# Patient Record
Sex: MALE | Race: WHITE | Employment: FULL TIME | URBAN - METROPOLITAN AREA
[De-identification: names, ages, dates, MRNs, and addresses within clinical notes are randomized per-mention and may not be internally consistent; named-entity substitution may affect disease eponyms.]

---

## 2020-08-10 ENCOUNTER — APPOINTMENT (OUTPATIENT)
Dept: GENERAL RADIOLOGY | Age: 35
End: 2020-08-10
Payer: COMMERCIAL

## 2020-08-10 ENCOUNTER — HOSPITAL ENCOUNTER (EMERGENCY)
Age: 35
Discharge: HOME | End: 2020-08-10
Attending: EMERGENCY MEDICINE
Payer: COMMERCIAL

## 2020-08-10 VITALS
DIASTOLIC BLOOD PRESSURE: 84 MMHG | HEIGHT: 68 IN | BODY MASS INDEX: 47.74 KG/M2 | OXYGEN SATURATION: 97 % | HEART RATE: 83 BPM | RESPIRATION RATE: 13 BRPM | SYSTOLIC BLOOD PRESSURE: 116 MMHG | TEMPERATURE: 98.4 F | WEIGHT: 315 LBS

## 2020-08-10 LAB
ABSOLUTE EOS #: 0.1 K/UL (ref 0–0.4)
ABSOLUTE IMMATURE GRANULOCYTE: NORMAL K/UL (ref 0–0.3)
ABSOLUTE LYMPH #: 2.4 K/UL (ref 1–4.8)
ABSOLUTE MONO #: 0.5 K/UL (ref 0.1–1.2)
ALBUMIN SERPL-MCNC: 4.4 G/DL (ref 3.5–5.2)
ALBUMIN/GLOBULIN RATIO: 1.6 (ref 1–2.5)
ALP BLD-CCNC: 86 U/L (ref 40–129)
ALT SERPL-CCNC: 70 U/L (ref 5–41)
ANION GAP SERPL CALCULATED.3IONS-SCNC: 13 MMOL/L (ref 9–17)
AST SERPL-CCNC: 34 U/L
BASOPHILS # BLD: 1 % (ref 0–2)
BASOPHILS ABSOLUTE: 0.1 K/UL (ref 0–0.2)
BILIRUB SERPL-MCNC: 0.3 MG/DL (ref 0.3–1.2)
BNP INTERPRETATION: NORMAL
BUN BLDV-MCNC: 10 MG/DL (ref 6–20)
BUN/CREAT BLD: ABNORMAL (ref 9–20)
CALCIUM SERPL-MCNC: 9.4 MG/DL (ref 8.6–10.4)
CHLORIDE BLD-SCNC: 101 MMOL/L (ref 98–107)
CO2: 24 MMOL/L (ref 20–31)
CREAT SERPL-MCNC: 0.83 MG/DL (ref 0.7–1.2)
DIFFERENTIAL TYPE: NORMAL
EOSINOPHILS RELATIVE PERCENT: 2 % (ref 1–4)
GFR AFRICAN AMERICAN: >60 ML/MIN
GFR NON-AFRICAN AMERICAN: >60 ML/MIN
GFR SERPL CREATININE-BSD FRML MDRD: ABNORMAL ML/MIN/{1.73_M2}
GFR SERPL CREATININE-BSD FRML MDRD: ABNORMAL ML/MIN/{1.73_M2}
GLUCOSE BLD-MCNC: 99 MG/DL (ref 70–99)
HCT VFR BLD CALC: 46.1 % (ref 41–53)
HEMOGLOBIN: 15.6 G/DL (ref 13.5–17.5)
IMMATURE GRANULOCYTES: NORMAL %
LYMPHOCYTES # BLD: 29 % (ref 24–44)
MCH RBC QN AUTO: 28.3 PG (ref 26–34)
MCHC RBC AUTO-ENTMCNC: 33.9 G/DL (ref 31–37)
MCV RBC AUTO: 83.3 FL (ref 80–100)
MONOCYTES # BLD: 6 % (ref 2–11)
NRBC AUTOMATED: NORMAL PER 100 WBC
PDW BLD-RTO: 14.4 % (ref 12.5–15.4)
PLATELET # BLD: 269 K/UL (ref 140–450)
PLATELET ESTIMATE: NORMAL
PMV BLD AUTO: 8.8 FL (ref 6–12)
POTASSIUM SERPL-SCNC: 4.4 MMOL/L (ref 3.7–5.3)
PRO-BNP: 127 PG/ML
RBC # BLD: 5.53 M/UL (ref 4.5–5.9)
RBC # BLD: NORMAL 10*6/UL
SEG NEUTROPHILS: 62 % (ref 36–66)
SEGMENTED NEUTROPHILS ABSOLUTE COUNT: 5.1 K/UL (ref 1.8–7.7)
SODIUM BLD-SCNC: 138 MMOL/L (ref 135–144)
TOTAL PROTEIN: 7.2 G/DL (ref 6.4–8.3)
TROPONIN INTERP: NORMAL
TROPONIN T: NORMAL NG/ML
TROPONIN, HIGH SENSITIVITY: <6 NG/L (ref 0–22)
TSH SERPL DL<=0.05 MIU/L-ACNC: 2.32 MIU/L (ref 0.3–5)
WBC # BLD: 8.2 K/UL (ref 3.5–11)
WBC # BLD: NORMAL 10*3/UL

## 2020-08-10 PROCEDURE — 99285 EMERGENCY DEPT VISIT HI MDM: CPT

## 2020-08-10 PROCEDURE — 83880 ASSAY OF NATRIURETIC PEPTIDE: CPT

## 2020-08-10 PROCEDURE — 36415 COLL VENOUS BLD VENIPUNCTURE: CPT

## 2020-08-10 PROCEDURE — 93005 ELECTROCARDIOGRAM TRACING: CPT | Performed by: NURSE PRACTITIONER

## 2020-08-10 PROCEDURE — 85025 COMPLETE CBC W/AUTO DIFF WBC: CPT

## 2020-08-10 PROCEDURE — 84443 ASSAY THYROID STIM HORMONE: CPT

## 2020-08-10 PROCEDURE — 80053 COMPREHEN METABOLIC PANEL: CPT

## 2020-08-10 PROCEDURE — 71045 X-RAY EXAM CHEST 1 VIEW: CPT

## 2020-08-10 PROCEDURE — 84484 ASSAY OF TROPONIN QUANT: CPT

## 2020-08-10 RX ORDER — OMEPRAZOLE 10 MG/1
10 CAPSULE, DELAYED RELEASE ORAL DAILY
COMMUNITY

## 2020-08-10 ASSESSMENT — ENCOUNTER SYMPTOMS
COUGH: 0
NAUSEA: 0
SINUS PAIN: 0
STRIDOR: 0
SINUS PRESSURE: 0
SORE THROAT: 0
BACK PAIN: 0
SHORTNESS OF BREATH: 0
ABDOMINAL PAIN: 0
VOMITING: 0
CHEST TIGHTNESS: 0
WHEEZING: 0
CONSTIPATION: 0
DIARRHEA: 0

## 2020-08-10 NOTE — ED PROVIDER NOTES
[id number] [facility] ED  [address] [facility] [address] [facility] [address]  Phone: [phone]  Fax: [phone]      Attending Physician Attestation    I performed a history and physical examination of the patient and discussed management with the mid level provider. I reviewed the mid level provider's note and agree with the documented findings and plan of care. Any areas of disagreement are noted on the chart. I was personally present for the key portions of any procedures. I have documented in the chart those procedures where I was not present during the key portions. I have reviewed the emergency nurses triage note. I agree with the chief complaint, past medical history, past surgical history, allergies, medications, social and family history as documented unless otherwise noted below. Documentation of the HPI, Physical Exam and Medical Decision Making performed by mid level providers is based on my personal performance of the HPI, PE and MDM. For Physician Assistant/ Nurse Practitioner cases/documentation I have personally evaluated this patient and have completed at least one if not all key elements of the E/M (history, physical exam, and MDM). Additional findings are as noted. CHIEF COMPLAINT       Chief Complaint   Patient presents with    Chest Pain     pt c/o chest pain leg swelling x 3 weeks on and off with palpation    Leg Swelling         HISTORY OF PRESENT ILLNESS    Yamilka Neumann is a 38 y.o. male who presents with chest pain palpitations and leg swelling. The patient for the last several weeks has had episodes of some chest tightness palpitations and leg swelling  Both legs are swollen nothing he does makes his symptoms better he notices that caffeine makes his symptoms worse    PAST MEDICAL HISTORY    has no past medical history on file. SURGICAL HISTORY      has no past surgical history on file.     CURRENT MEDICATIONS       Previous Medications    OMEPRAZOLE (PRILOSEC) 10 MG DELAYED RELEASE CAPSULE    Take 10 mg by mouth daily       ALLERGIES     is allergic to amoxicillin. FAMILY HISTORY     has no family status information on file. family history is not on file. SOCIAL HISTORY      reports that he has been smoking. He uses smokeless tobacco. He reports previous alcohol use. PHYSICAL EXAM     INITIAL VITALS:  height is 5' 8\" (1.727 m) and weight is 145 kg (319 lb 10.7 oz) (abnormal). His oral temperature is 98.4 °F (36.9 °C). His blood pressure is 127/91 (abnormal) and his pulse is 77. His respiration is 18 and oxygen saturation is 97%. The head is normocephalic  The neck is supple trachea midline no adenopathy no meningeal signs  Cardiac S1-S2 with a regular rate and rhythm  Pulmonary is clear to auscultation bilateral  Abdomen is soft nontender nondistended with positive bowel sounds  Extremities are warm and dry with good pulses motor sensation trace edema of the bilateral lower extremities   Skin is without rashes or lesions  Neurologic GCS is 15 and no focal deficits are appreciated      DIAGNOSTIC RESULTS     EKG: All EKG's are interpreted by the Emergency Department Physician who either signs or Co-signs this chart in the absence of a cardiologist.      Interpreted by Nish Carvaajl MD     Rhythm: normal sinus   Rate: 79  Axis: 59  Ectopy: Occasional PVC  Conduction: normal  ST Segments: no acute change  T Waves: no acute change  Q Waves: none    Clinical Impression: normal sinus rhythm with no acute changes/normal EKG. No acute infarction/ischemia noted. RADIOLOGY:   Non-plain film images such as CT, Ultrasound and MRI are read by the radiologist. [facility] radiographic images are visualized and the radiologist interpretations are reviewed as follows:     XR CHEST PORTABLE (Final result)   Result time 08/10/20 13:57:32   Final result by Chuck Kramer MD (08/10/20 13:57:32)                 Impression:     No acute process.              Narrative: 15.4 %    Platelets 761 938 - 260 k/uL    MPV 8.8 6.0 - 12.0 fL    NRBC Automated NOT REPORTED per 100 WBC    Differential Type NOT REPORTED     Seg Neutrophils 62 36 - 66 %    Lymphocytes 29 24 - 44 %    Monocytes 6 2 - 11 %    Eosinophils % 2 1 - 4 %    Basophils 1 0 - 2 %    Immature Granulocytes NOT REPORTED 0 %    Segs Absolute 5.10 1.8 - 7.7 k/uL    Absolute Lymph # 2.40 1.0 - 4.8 k/uL    Absolute Mono # 0.50 0.1 - 1.2 k/uL    Absolute Eos # 0.10 0.0 - 0.4 k/uL    Basophils Absolute 0.10 0.0 - 0.2 k/uL    Absolute Immature Granulocyte NOT REPORTED 0.00 - 0.30 k/uL    WBC Morphology NOT REPORTED     RBC Morphology NOT REPORTED     Platelet Estimate NOT REPORTED            EMERGENCY DEPARTMENT COURSE:   Vitals:    Vitals:    08/10/20 1310   BP: (!) 127/91   Pulse: 77   Resp: 18   Temp: 98.4 °F (36.9 °C)   TempSrc: Oral   SpO2: 97%   Weight: (!) 145 kg (319 lb 10.7 oz)   Height: 5' 8\" (1.727 m)     -------------------------  BP: (!) 127/91, Temp: 98.4 °F (36.9 °C), Pulse: 77, Resp: 18      PERTINENT ATTENDING PHYSICIAN COMMENTS:    Lab work chest x-ray EKG are all unremarkable the patient's history sounds like the patient may suffer from sleep apnea of which I am recommending that he call his family doctor to discuss a sleep study. The patient is to return to the ER for worsening of symptoms increasing shortness of breath or other concerns otherwise to follow-up with his family doctor within the next few days  The patient presents with chest pain that is not suggesting in nature of pulmonary embolus, aortic dissection, cardiac ischemia, or other serious etiology. Given the extremely low risk of these diagnoses further testing and evaluation for these possibilites are not indicated at this time. The patient appears stable for discharge and has been instructed to return immediately if the symptoms worsen in any way, or in 8-12 hr if not improved for re-evaluation.   We also discussed returning to the Emergency Department immediately if new or worsening symptoms occur. We have discussed the symptoms which are most concerning (e.g., worsening pain, shortness of breath, a feeling of passing out, fever, any neurologic symptoms, abdominal pain or vomiting) that necessitate immediate return. The patient understands that at this time there is no evidence for a more malignant underlying process, but the patient also understands that early in the process of an illness or injury, an emergency department workup can be falsely reassuring. Routine discharge counseling was given, and the patient understands that worsening, changing or persistent symptoms should prompt an immediate call or follow up with their primary physician or return to the emergency department. The importance of appropriate follow up was also discussed. I have reviewed the disposition diagnosis with the patient and or their family/guardian. I have answered their questions and given discharge instructions. They voiced understanding of these instructions and did not have any further questions or complaints. (Please note that portions of this note were completed with a voice recognition program.  Efforts were made to edit the dictations but occasionally words are mis-transcribed.)    Perez MD, F.A.C.E.P.   Attending Emergency Medicine Physician       Cleve Espinoza MD  08/10/20 [id number]

## 2020-08-10 NOTE — ED PROVIDER NOTES
[address] [facility] ED  [address] [facility] [place] [address]  Phone: [phone]  Fax: [phone]      Pt Name: Chato Aviles  MRN: [id number]  [place] 1985  Date of evaluation: 8/10/2020      CHIEF COMPLAINT       Chief Complaint   Patient presents with    Chest Pain     pt c/o chest pain leg swelling x 3 weeks on and off with palpation    Leg Swelling       HISTORY OF PRESENT ILLNESS   (Location, Quality, Severity, Duration, Timing, Context, ModifyingFactors, Associated Signs and Symptoms)     Chato Aviles is a 38 y.o. male who presents with intermittent complaints of chest pain with palpitations accompanied with bilateral lower leg swelling. Patient reports sensation of heart skipping beats intermittently for the past 3 weeks accompanied with pain to right side of chest with these episodes, denies any radiation of pain. Patient denies any shortness of breath or cough. Patient reports did cut down on caffeine which he originally thought was related to the symptoms, however reports continues to have intermittent episodes. Patient reports bilateral lower leg swelling, reports does spend a lot of the day sitting down, denies any recent long travel or history of blood clots. Patient reports leg swelling is worse in the evening, however he does still wake up sometimes in the morning with  Mild leg swelling. Patient reports no complaints today, actually reports feeling pretty well, however his wife had contacted their primary care provider today regarding the symptoms who had recommended they present to the emergency department. She does admit has not seen his primary care provider in over 2 years. Patient is obese and does report sometimes in the middle night he wakes up and feels like he cannot breathe, wife does report he does have intermittent snoring and has been having increased fatigue, taking more naps throughout the day and going to bed earlier.     Nursing Notes were reviewed. REVIEW OF SYSTEMS     (2-9 systems for level 4, 10 or more for level 5)    Review of Systems   Constitutional: Positive for fatigue. Negative for activity change, chills and fever. HENT: Negative for congestion, ear pain, sinus pressure, sinus pain and sore throat. Respiratory: Negative for cough, chest tightness, shortness of breath, wheezing and stridor. Cardiovascular: Positive for chest pain, palpitations and leg swelling. Gastrointestinal: Negative for abdominal pain, constipation, diarrhea, nausea and vomiting. Genitourinary: Negative for dysuria, flank pain and urgency. Musculoskeletal: Negative for arthralgias, back pain, neck pain and neck stiffness. Skin: Negative for pallor, rash and wound. Neurological: Negative for dizziness, weakness, light-headedness, numbness and headaches. PAST MEDICAL HISTORY    has no past medical history on file. SURGICAL HISTORY      has no past surgical history on file. CURRENT MEDICATIONS       Discharge Medication List as of 8/10/2020  2:26 PM      CONTINUE these medications which have NOT CHANGED    Details   omeprazole (PRILOSEC) 10 MG delayed release capsule Take 10 mg by mouth dailyHistorical Med             ALLERGIES     is allergic to amoxicillin. FAMILY HISTORY     has no family status information on file. family history is not on file. SOCIAL HISTORY      reports that he has been smoking. He uses smokeless tobacco. He reports previous alcohol use. PHYSICAL EXAM     (7 for level 4, 8 or more for level 5)    ED Triage Vitals [08/10/20 1310]   BP Temp Temp Source Pulse Resp SpO2 Height Weight   (!) 127/91 98.4 °F (36.9 °C) Oral 77 18 97 % 5' 8\" (1.727 m) (!) 319 lb 10.7 oz (145 kg)       Physical Exam  Vitals signs reviewed. Constitutional:       General: He is awake. He is not in acute distress. Appearance: Normal appearance. He is well-developed. He is obese.  He is not ill-appearing, toxic-appearing or diaphoretic. HENT:      Head: Normocephalic and atraumatic. Right Ear: Tympanic membrane normal.      Left Ear: Tympanic membrane normal.   Neck:      Musculoskeletal: Full passive range of motion without pain. Cardiovascular:      Rate and Rhythm: Normal rate and regular rhythm. Heart sounds: Normal heart sounds. Pulmonary:      Effort: Pulmonary effort is normal.      Breath sounds: Normal breath sounds. Abdominal:      General: Abdomen is flat. Bowel sounds are normal. There is no distension. Palpations: Abdomen is soft. There is no mass. Tenderness: There is no abdominal tenderness. There is no guarding. Musculoskeletal: Normal range of motion. Right lower leg: No edema. Left lower leg: No edema. Skin:     General: Skin is warm and dry. Capillary Refill: Capillary refill takes less than 2 seconds. Neurological:      General: No focal deficit present. Mental Status: He is alert and oriented to person, place, and time. Mental status is at baseline. Psychiatric:         Mood and Affect: Mood normal.         Behavior: Behavior is cooperative. DIAGNOSTIC RESULTS     RADIOLOGY:   Radiology images were visualized by myself. The Radiologist interpretations were reviewed and are as follows: FINDINGS:    The lungs are without acute focal process.  There is no effusion or    pneumothorax. The cardiomediastinal silhouette is without acute process. The    osseous structures are without acute process.              Impression    No acute process.           LABS:  Results for orders placed or performed during the hospital encounter of 08/10/20   Comprehensive Metabolic Panel w/ Reflex to MG   Result Value Ref Range    Glucose 99 70 - 99 mg/dL    BUN 10 6 - 20 mg/dL    CREATININE 0.83 0.70 - 1.20 mg/dL    Bun/Cre Ratio NOT REPORTED 9 - 20    Calcium 9.4 8.6 - 10.4 mg/dL    Sodium 138 135 - 144 mmol/L    Potassium 4.4 3.7 - 5.3 mmol/L Chloride 101 98 - 107 mmol/L    CO2 24 20 - 31 mmol/L    Anion Gap 13 9 - 17 mmol/L    Alkaline Phosphatase 86 40 - 129 U/L    ALT 70 (H) 5 - 41 U/L    AST 34 <40 U/L    Total Bilirubin 0.30 0.3 - 1.2 mg/dL    Total Protein 7.2 6.4 - 8.3 g/dL    Alb 4.4 3.5 - 5.2 g/dL    Albumin/Globulin Ratio 1.6 1.0 - 2.5    GFR Non-African American >60 >60 mL/min    GFR African American >60 >60 mL/min    GFR Comment          GFR Staging NOT REPORTED    Troponin   Result Value Ref Range    Troponin, High Sensitivity <6 0 - 22 ng/L    Troponin T NOT REPORTED <0.03 ng/mL    Troponin Interp NOT REPORTED    Brain Natriuretic Peptide   Result Value Ref Range    Pro- <300 pg/mL    BNP Interpretation Pro-BNP Reference Range:    CBC Auto Differential   Result Value Ref Range    WBC 8.2 3.5 - 11.0 k/uL    RBC 5.53 4.5 - 5.9 m/uL    Hemoglobin 15.6 13.5 - 17.5 g/dL    Hematocrit 46.1 41 - 53 %    MCV 83.3 80 - 100 fL    MCH 28.3 26 - 34 pg    MCHC 33.9 31 - 37 g/dL    RDW 14.4 12.5 - 15.4 %    Platelets 941 180 - 320 k/uL    MPV 8.8 6.0 - 12.0 fL    NRBC Automated NOT REPORTED per 100 WBC    Differential Type NOT REPORTED     Seg Neutrophils 62 36 - 66 %    Lymphocytes 29 24 - 44 %    Monocytes 6 2 - 11 %    Eosinophils % 2 1 - 4 %    Basophils 1 0 - 2 %    Immature Granulocytes NOT REPORTED 0 %    Segs Absolute 5.10 1.8 - 7.7 k/uL    Absolute Lymph # 2.40 1.0 - 4.8 k/uL    Absolute Mono # 0.50 0.1 - 1.2 k/uL    Absolute Eos # 0.10 0.0 - 0.4 k/uL    Basophils Absolute 0.10 0.0 - 0.2 k/uL    Absolute Immature Granulocyte NOT REPORTED 0.00 - 0.30 k/uL    WBC Morphology NOT REPORTED     RBC Morphology NOT REPORTED     Platelet Estimate NOT REPORTED        MDM:   We will plan for cardiac work-up including basic labs, BNP, troponin, chest x-ray as well as thyroid studies. Lab work-up, chest x-ray, EKG unremarkable at this time. Patient remains asymptomatic. Thyroid studies pending.   Patient will be discharged home and instructed to follow-up with primary care provider soon as possible. Patient was spoken to about possible sleep apnea. Patient educated to return to emergency department with any worsening of symptoms. EKG Interpretation  Rhythm: normal sinus   Rate: normal  Axis: normal  Ectopy: premature ventricular contractions (unifocal) and premature ventricular contractions (infrequent)  Conduction: normal  ST Segments: no acute change  T Waves: no acute change  Q Waves: none    EKG  Impression: NSR with infrequent PVCs  EKG reviewed by         EMERGENCY DEPARTMENT COURSE:   Vitals:    Vitals:    08/10/20 1315 08/10/20 1400 08/10/20 1415 08/10/20 1430   BP: (!) 138/90 116/73 114/68 116/84   Pulse: 82 78 84 83   Resp: 12 13 23 13   Temp:       TempSrc:       SpO2: 98% 96% 97% 97%   Weight:       Height:         -------------------------  BP: 116/84, Temp: 98.4 °F (36.9 °C), Pulse: 83, Resp: 13      The patient was given the following medications:  No orders of the defined types were placed in this encounter. FINAL IMPRESSION      1. Chest pain, unspecified type    2.  Sleep apnea, unspecified type          DISPOSITION/PLAN   DISPOSITION        Condition on Disposition  Stable    PATIENT REFERRED TO:  Cara tSuart MD  [address] [place] [id number]  [phone]    Call in 1 day        DISCHARGE MEDICATIONS:  Discharge Medication List as of 8/10/2020  2:26 PM          (Please note that portions of this note were completed with a voice recognition program.  Efforts were made to edit the dictations but occasionally words are mis-transcribed.)    YONY Richardson - CNP  08/10/20 [id number]

## 2020-08-11 LAB
EKG ATRIAL RATE: 79 BPM
EKG P AXIS: 40 DEGREES
EKG P-R INTERVAL: 150 MS
EKG Q-T INTERVAL: 380 MS
EKG QRS DURATION: 92 MS
EKG QTC CALCULATION (BAZETT): 435 MS
EKG R AXIS: 59 DEGREES
EKG T AXIS: 16 DEGREES
EKG VENTRICULAR RATE: 79 BPM